# Patient Record
(demographics unavailable — no encounter records)

---

## 2025-06-02 NOTE — HISTORY OF PRESENT ILLNESS
[FreeTextEntry1] : ROXANNE is 17 y/o wm seen c m re r nasal alar nevus which is large and raised  The risks, benefits, alternatives, limitations and the permanent scars were outlined with the patient. Discussion of risks included but not limited to bleeding, infection, delayed healing, and anesthetic risk. ROXANNE desires removal with minimally invasive treatment ROXANNE does not want excision and is requesting shave biopsy ROXANNE may require excision after shave is completed depending on pathology and resulting scar appearance /possible recurrence

## 2025-06-02 NOTE — PHYSICAL EXAM
[NI] : Normal [de-identified] : r nasal nevus 1 cm diameter light pigment no ulceration or bleeding along r alar crease

## 2025-06-02 NOTE — ASSESSMENT
[FreeTextEntry1] : ROXANNE will return for shave biopsy The risks, benefits, alternatives, limitations and the permanent scars were outlined with the patient. Discussion of risks included but not limited to bleeding, infection, delayed healing, and anesthetic risk. All of ROXANNE 's questions and concerns were addressed and answered completely  The instructions were reviewed in detail with ROXANNE.

## 2025-07-01 NOTE — PROCEDURE
[FreeTextEntry6] : With aseptic technique and local anesthetic 1% lidocaine with 100,000 epinephrine. The surgical site r nasal ala was sterilely cleansed and injected with 1/2 cc of lidocaine. Using a #15 blade a superficial shave biopsy was completed, and the area was desiccated with electrocautery. The patient tolerated the procedure well and was given instructions regarding care.  the lesion extended well below the shave depth

## 2025-07-01 NOTE — ASSESSMENT
[FreeTextEntry1] : ROXANNE tolerated the procedure well. The instructions were reviewed in detail with ROXANNE.  ROXANNE understands that the lesion may recur or may remain flat  and depending on the final disposition of pathology and pt healing/ scarring we may excise the base of the lesion in the resting skin tension lines if needed